# Patient Record
Sex: MALE | Race: WHITE | NOT HISPANIC OR LATINO | Employment: FULL TIME | URBAN - METROPOLITAN AREA
[De-identification: names, ages, dates, MRNs, and addresses within clinical notes are randomized per-mention and may not be internally consistent; named-entity substitution may affect disease eponyms.]

---

## 2017-01-12 ENCOUNTER — OFFICE VISIT (OUTPATIENT)
Dept: MEDICAL GROUP | Facility: MEDICAL CENTER | Age: 32
End: 2017-01-12
Payer: COMMERCIAL

## 2017-01-12 VITALS
RESPIRATION RATE: 16 BRPM | DIASTOLIC BLOOD PRESSURE: 96 MMHG | HEIGHT: 71 IN | BODY MASS INDEX: 32.34 KG/M2 | WEIGHT: 231 LBS | SYSTOLIC BLOOD PRESSURE: 132 MMHG | HEART RATE: 96 BPM | TEMPERATURE: 98.2 F | OXYGEN SATURATION: 93 %

## 2017-01-12 DIAGNOSIS — E66.9 OBESITY (BMI 30-39.9): ICD-10-CM

## 2017-01-12 DIAGNOSIS — I10 ESSENTIAL HYPERTENSION: ICD-10-CM

## 2017-01-12 DIAGNOSIS — M54.2 NECK PAIN: ICD-10-CM

## 2017-01-12 PROCEDURE — 99213 OFFICE O/P EST LOW 20 MIN: CPT | Performed by: NURSE PRACTITIONER

## 2017-01-12 ASSESSMENT — ENCOUNTER SYMPTOMS: NECK PAIN: 1

## 2017-01-12 ASSESSMENT — PATIENT HEALTH QUESTIONNAIRE - PHQ9: CLINICAL INTERPRETATION OF PHQ2 SCORE: 0

## 2017-01-12 NOTE — PROGRESS NOTES
"Subjective:      Berny Robles is a 31 y.o. male who presents with Follow-Up            HPI Berny Robles is here today mainly requesting a letter to return to work because of recent missed work for neck pain.    Patient states he stayed at a hotel about a week ago which had a very poorly supported bed and he woke up with neck pain. He states the pain is almost gone but will need a note to return to work tomorrow. He states the pain is mostly in the posterior left neck and is about 80% better. The pain does not radiate into his arms and there is no pain with rest. He denies shortness of breath, chest pain, dizziness or headache. Symptoms are better with ibuprofen.    Patient's blood pressure is noted to be mildly elevated today and it appears that his last 2 blood pressure readings from 2015 and 2014 also were mildly elevated. He states when he goes for his [employer] license his blood pressures are typically in the 140/90 range. He has never been on antihypertensive medicine. He does not smoke. I have not seen patient since 2014.        Social History   Substance Use Topics   • Smoking status: Never Smoker    • Smokeless tobacco: Never Used   • Alcohol Use: 0.0 oz/week     0 Standard drinks or equivalent per week      Comment: rare     No current outpatient prescriptions on file.     No current facility-administered medications for this visit.   History reviewed. No pertinent past medical history.   Family History   Problem Relation Age of Onset   • Hypertension Father    • Heart Attack Father      45       Review of Systems   Musculoskeletal: Positive for neck pain.   All other systems reviewed and are negative.         Objective:     /96 mmHg  Pulse 96  Temp(Src) 36.8 °C (98.2 °F)  Resp 16  Ht 1.803 m (5' 11\")  Wt 104.781 kg (231 lb)  BMI 32.23 kg/m2  SpO2 93%     Physical Exam   Constitutional: He is oriented to person, place, and time. He appears well-developed and well-nourished. No distress. "   HENT:   Head: Normocephalic and atraumatic.   Right Ear: External ear normal.   Left Ear: External ear normal.   Nose: Nose normal.   Mouth/Throat: Oropharynx is clear and moist.   Eyes: Conjunctivae are normal. Right eye exhibits no discharge. Left eye exhibits no discharge.   Neck: Normal range of motion. Neck supple. No tracheal deviation present. No thyromegaly present.   Cardiovascular: Normal rate, regular rhythm and normal heart sounds.    No murmur heard.  Pulmonary/Chest: Effort normal and breath sounds normal. No respiratory distress. He has no wheezes. He has no rales.   Musculoskeletal:   Mildly decreased range of motion with rotation of the head and neck but no tenderness or radiculopathy. 5/5 strength of upper extremities bilaterally to flexion and extension.   Lymphadenopathy:     He has no cervical adenopathy.   Neurological: He is alert and oriented to person, place, and time. Coordination normal.   Skin: Skin is warm and dry. No rash noted. He is not diaphoretic. No erythema.   Psychiatric: He has a normal mood and affect. His behavior is normal. Judgment and thought content normal.   Nursing note and vitals reviewed.              Assessment/Plan:     1. Neck pain  Patient was given a letter that he may return to work tomorrow and may continue to use ibuprofen short-term for pain. I spoke with him about heat to the area and returning if it does not improve.    2. Essential hypertension  I reviewed with patient that his blood pressure has been running elevated with most of his visits. I told him to try to keep track of his blood pressure outside the office and then return in 6 weeks after doing lab work. If his blood pressure is still elevated I recommended we start him on a low dose antihypertensive medicine and the risks of untreated blood pressure elevations.  - COMP METABOLIC PANEL; Future  - LIPID PROFILE; Future  - TSH; Future  - VITAMIN 1,25 DIHYDROXY; Future    3. Obesity (BMI  30-39.9)    - Patient identified as having weight management issue.  Appropriate orders and counseling given.

## 2017-01-12 NOTE — Clinical Note
January 12, 2017       Patient: Berny Robles   YOB: 1985   Date of Visit: 1/12/2017         To Whom It May Concern:    It is my medical opinion that Berny Robles return to work on 1/13/17..    If you have any questions or concerns, please don't hesitate to call [phone]          Sincerely,          TYLER Chowdary.  Electronically Signed

## 2017-01-12 NOTE — MR AVS SNAPSHOT
"        Berny Robles   2017 1:40 PM   Office Visit   MRN: [id number]    Department:  [address] [place] [address]   Dept Phone:  [phone]    Description:  Male : 1985   Provider:  SEBASTIEN Chowdary           Reason for Visit     Follow-Up release papers      Allergies as of 2017     Allergen Noted Reactions    Norco [Hydrocodone-Acetaminophen] 2012       Causes Headache      You were diagnosed with     Essential hypertension   [6132886]       Neck pain   [551132]         Vital Signs     Blood Pressure Pulse Temperature Respirations Height Weight    132/96 mmHg 96 36.8 °C (98.2 °F) 16 1.803 m (5' 11\") 104.781 kg (231 lb)    Body Mass Index Oxygen Saturation Smoking Status             32.23 kg/m2 93% Never Smoker          Basic Information     Date Of Birth Sex Race Ethnicity Preferred Language    1985 Male White Non- English      Your appointments     2017  1:40 PM   Established Patient with SEBASTIEN Chowdary   [facility] [address]    [address] [facility] [address]  [facility] [address]   [phone]           You will be receiving a confirmation call a few days before your appointment from our automated call confirmation system.              Problem List              ICD-10-CM Priority Class Noted - Resolved    FH: heart attack Z82.49   2012 - Present    Back pain M54.9   2012 - Present      Health Maintenance        Date Due Completion Dates    IMM DTaP/Tdap/Td Vaccine (1 - Tdap) 2006 ---    IMM INFLUENZA (1) 2016 ---            Current Immunizations     No immunizations on file.      Below and/or attached are the medications your provider expects you to take. Review all of your home medications and newly ordered medications with your provider and/or pharmacist. Follow medication instructions as directed by your provider and/or pharmacist. Please keep your medication list with you and share with your provider. Update the " information when medications are discontinued, doses are changed, or new medications (including over-the-counter products) are added; and carry medication information at all times in the event of emergency situations     Allergies:  NORCO - (reactions not documented)               Medications  Valid as of: January 12, 2017 -  1:59 PM    Generic Name Brand Name Tablet Size Instructions for use    .                 Medicines prescribed today were sent to:     [facility] [address] [place] [address], [place] - [address] [facility]    [address] [facility] [place] [address]    Phone: [phone] Fax: [phone]    Open 24 Hours?: No      Medication refill instructions:       If your prescription bottle indicates you have medication refills left, it is not necessary to call your provider’s office. Please contact your pharmacy and they will refill your medication.    If your prescription bottle indicates you do not have any refills left, you may request refills at any time through one of the following ways: The online [employer] system (except Urgent Care), by calling your provider’s office, or by asking your pharmacy to contact your provider’s office with a refill request. Medication refills are processed only during regular business hours and may not be available until the next business day. Your provider may request additional information or to have a follow-up visit with you prior to refilling your medication.   *Please Note: Medication refills are assigned a new Rx number when refilled electronically. Your pharmacy may indicate that no refills were authorized even though a new prescription for the same medication is available at the pharmacy. Please request the medicine by name with the pharmacy before contacting your provider for a refill.        Your To Do List     Future Labs/Procedures Complete By Expires    COMP METABOLIC PANEL  As directed 1/13/2018    LIPID PROFILE  As directed 1/13/2018    TSH  As directed  1/13/2018    VITAMIN 1,25 DIHYDROXY  As directed 1/13/2018         MyChart Status: Patient Declined

## 2021-01-14 ENCOUNTER — OFFICE VISIT (OUTPATIENT)
Dept: URGENT CARE | Facility: CLINIC | Age: 36
End: 2021-01-14

## 2021-01-14 DIAGNOSIS — Z01.10 ENCOUNTER FOR AUDIOLOGY EVALUATION: ICD-10-CM

## 2021-01-14 DIAGNOSIS — Z02.1 PRE-EMPLOYMENT DRUG SCREENING: ICD-10-CM

## 2021-01-14 LAB
AMP AMPHETAMINE: NORMAL
BAR BARBITURATES: NORMAL
BZO BENZODIAZEPINES: NORMAL
COC COCAINE: NORMAL
INT CON NEG: NORMAL
INT CON POS: NORMAL
MDMA ECSTASY: NORMAL
MET METHAMPHETAMINES: NORMAL
MTD METHADONE: NORMAL
OPI OPIATES: NORMAL
OXY OXYCODONE: NORMAL
PCP PHENCYCLIDINE: NORMAL
POC URINE DRUG SCREEN OCDRS: NORMAL
THC: NORMAL

## 2021-01-14 PROCEDURE — 8916 PR CLEARANCE ONLY: Performed by: NURSE PRACTITIONER

## 2021-01-14 PROCEDURE — 80305 DRUG TEST PRSMV DIR OPT OBS: CPT | Performed by: NURSE PRACTITIONER

## 2021-01-14 PROCEDURE — 92552 PURE TONE AUDIOMETRY AIR: CPT | Performed by: NURSE PRACTITIONER

## 2021-01-14 NOTE — PROGRESS NOTES
Subjective:      Berny Robles is a 35 y.o. male who presents with Medical Clearance (Audio/UDS)                    Audiogram/drug screen only, no exam.    PMH:  has no past medical history on file.  MEDS: No current outpatient medications on file.  ALLERGIES:   Allergies   Allergen Reactions   • Norco [Hydrocodone-Acetaminophen]      Causes Headache     SURGHX:   Past Surgical History:   Procedure Laterality Date   • TONSILLECTOMY       SOCHX:  reports that he has never smoked. He has never used smokeless tobacco. He reports current alcohol use. He reports that he does not use drugs.  FH: Family history was reviewed, no pertinent findings to report    ROS       Objective:     There were no vitals taken for this visit.     Physical Exam            Assessment/Plan:        1. Pre-employment drug screening    - POCT 11 Panel Urine Drug Screen    2. Encounter for audiology evaluation

## 2021-06-16 ENCOUNTER — OFFICE VISIT (OUTPATIENT)
Dept: URGENT CARE | Facility: CLINIC | Age: 36
End: 2021-06-16
Payer: COMMERCIAL

## 2021-06-16 VITALS
HEIGHT: 71 IN | SYSTOLIC BLOOD PRESSURE: 130 MMHG | HEART RATE: 81 BPM | TEMPERATURE: 97.3 F | BODY MASS INDEX: 32.2 KG/M2 | WEIGHT: 230 LBS | OXYGEN SATURATION: 94 % | DIASTOLIC BLOOD PRESSURE: 90 MMHG | RESPIRATION RATE: 18 BRPM

## 2021-06-16 DIAGNOSIS — R42 VERTIGO: ICD-10-CM

## 2021-06-16 PROCEDURE — 99214 OFFICE O/P EST MOD 30 MIN: CPT | Performed by: NURSE PRACTITIONER

## 2021-06-16 RX ORDER — MECLIZINE HYDROCHLORIDE 25 MG/1
25 TABLET ORAL 3 TIMES DAILY PRN
Qty: 30 TABLET | Refills: 0 | Status: SHIPPED | OUTPATIENT
Start: 2021-06-16 | End: 2023-03-08

## 2021-06-16 RX ORDER — ONDANSETRON 4 MG/1
4 TABLET, ORALLY DISINTEGRATING ORAL EVERY 8 HOURS PRN
Qty: 10 TABLET | Refills: 0 | Status: SHIPPED | OUTPATIENT
Start: 2021-06-16 | End: 2023-03-08

## 2021-06-16 ASSESSMENT — ENCOUNTER SYMPTOMS
SHORTNESS OF BREATH: 0
FEVER: 0
VISUAL CHANGE: 0
COUGH: 0
CHILLS: 0
VERTIGO: 1
MYALGIAS: 0
NAUSEA: 1
SORE THROAT: 0
FATIGUE: 0
VOMITING: 0
EYE REDNESS: 0
DIZZINESS: 1

## 2021-06-16 NOTE — LETTER
June 16, 2021         Patient: Berny Robles   YOB: 1985   Date of Visit: 6/16/2021           To Whom it May Concern:    Berny Robles was seen in my clinic on 6/16/2021. He may return to work on 6/19/21    If you have any questions or concerns, please don't hesitate to call.        Sincerely,           IGGY Mcgarry  Electronically Signed

## 2021-06-17 NOTE — PROGRESS NOTES
Subjective:   Berny Robles is a 36 y.o. male who presents for Dizziness (when turning head, worse when looking up quickly, never happened before )      Dizziness  This is a new problem. The current episode started yesterday. The problem occurs intermittently (worse with head movement). The problem has been unchanged. Associated symptoms include nausea and vertigo. Pertinent negatives include no chest pain, chills, congestion, coughing, fatigue, fever, myalgias, rash, sore throat, visual change or vomiting. Exacerbated by: head movement  He has tried nothing (epley maneuver) for the symptoms. The treatment provided no relief.       Review of Systems   Constitutional: Negative for chills, fatigue and fever.   HENT: Negative for congestion and sore throat.    Eyes: Negative for redness.   Respiratory: Negative for cough and shortness of breath.    Cardiovascular: Negative for chest pain.   Gastrointestinal: Positive for nausea. Negative for vomiting.   Genitourinary: Negative for dysuria.   Musculoskeletal: Negative for myalgias.   Skin: Negative for rash.   Neurological: Positive for dizziness and vertigo.       Medications:    • meclizine Tabs  • ondansetron Tbdp    Allergies: Norco [hydrocodone-acetaminophen]    Problem List: Berny Robles does not have any pertinent problems on file.    Surgical History:  Past Surgical History:   Procedure Laterality Date   • TONSILLECTOMY         Past Social Hx: Berny Robles  reports that he has never smoked. He has never used smokeless tobacco. He reports current alcohol use. He reports that he does not use drugs.     Past Family Hx:  Berny Robles family history includes Heart Attack in his father; Hypertension in his father.     Problem list, medications, and allergies reviewed by myself today in Epic.     Objective:     /90 (BP Location: Left arm, Patient Position: Sitting, BP Cuff Size: Adult long)   Pulse 81   Temp 36.3 °C (97.3 °F) (Temporal)   Resp  "18   Ht 1.803 m (5' 11\")   Wt 104 kg (230 lb)   SpO2 94%   BMI 32.08 kg/m²     Physical Exam  Vitals and nursing note reviewed.   Constitutional:       General: He is not in acute distress.     Appearance: He is well-developed.   HENT:      Head: Normocephalic and atraumatic.      Right Ear: External ear normal.      Left Ear: External ear normal.      Nose: Nose normal.      Mouth/Throat:      Mouth: Mucous membranes are moist.   Eyes:      Extraocular Movements:      Right eye: Nystagmus present.      Conjunctiva/sclera: Conjunctivae normal.   Neck:      Meningeal: Brudzinski's sign and Kernig's sign absent.   Cardiovascular:      Rate and Rhythm: Normal rate.   Pulmonary:      Effort: Pulmonary effort is normal. No respiratory distress.      Breath sounds: Normal breath sounds.   Abdominal:      General: There is no distension.   Musculoskeletal:         General: Normal range of motion.      Cervical back: Full passive range of motion without pain.   Skin:     General: Skin is warm and dry.   Neurological:      General: No focal deficit present.      Mental Status: He is alert and oriented to person, place, and time. Mental status is at baseline. He is not disoriented.      GCS: GCS eye subscore is 4. GCS verbal subscore is 5. GCS motor subscore is 6.      Cranial Nerves: No cranial nerve deficit.      Sensory: No sensory deficit.      Motor: Motor function is intact.      Coordination: Coordination is intact.      Gait: Gait (gait at baseline) normal.      Deep Tendon Reflexes: Reflexes are normal and symmetric.      Comments: [place]-Hallpike positive   Psychiatric:         Judgment: Judgment normal.         Assessment/Plan:     Diagnosis and associated orders:     1. Vertigo  ondansetron (ZOFRAN ODT) 4 MG TABLET DISPERSIBLE    meclizine (ANTIVERT) 25 MG Tab    REFERRAL TO PHYSICAL THERAPY        Comments/MDM:     • Patient is a 36-year-old male present with the stated above, on physical exam patient does have " nystagmus, [place]-Hallpike positive, consistent with diagnoses.  Neurologically patient does appear to be intact.  Discussed home therapy with the Epley maneuver,Differential diagnosis, natural history, supportive care, and indications for immediate follow-up discussed.              Please note that this dictation was created using voice recognition software. I have made a reasonable attempt to correct obvious errors, but I expect that there are errors of grammar and possibly content that I did not discover before finalizing the note.    This note was electronically signed by Martín MEMBRENO.

## 2023-03-08 ENCOUNTER — OFFICE VISIT (OUTPATIENT)
Dept: URGENT CARE | Facility: PHYSICIAN GROUP | Age: 38
End: 2023-03-08
Payer: COMMERCIAL

## 2023-03-08 ENCOUNTER — HOSPITAL ENCOUNTER (OUTPATIENT)
Dept: LAB | Facility: MEDICAL CENTER | Age: 38
End: 2023-03-08
Attending: FAMILY MEDICINE
Payer: COMMERCIAL

## 2023-03-08 VITALS
DIASTOLIC BLOOD PRESSURE: 80 MMHG | BODY MASS INDEX: 32.76 KG/M2 | RESPIRATION RATE: 16 BRPM | HEIGHT: 71 IN | WEIGHT: 234 LBS | TEMPERATURE: 98.6 F | OXYGEN SATURATION: 98 % | SYSTOLIC BLOOD PRESSURE: 104 MMHG | HEART RATE: 99 BPM

## 2023-03-08 DIAGNOSIS — R10.9 ABDOMINAL PAIN, UNSPECIFIED ABDOMINAL LOCATION: ICD-10-CM

## 2023-03-08 LAB
ALBUMIN SERPL BCP-MCNC: 4.7 G/DL (ref 3.2–4.9)
ALBUMIN/GLOB SERPL: 1.7 G/DL
ALP SERPL-CCNC: 94 U/L (ref 30–99)
ALT SERPL-CCNC: 38 U/L (ref 2–50)
AMYLASE SERPL-CCNC: 45 U/L (ref 20–103)
ANION GAP SERPL CALC-SCNC: 11 MMOL/L (ref 7–16)
AST SERPL-CCNC: 23 U/L (ref 12–45)
BASOPHILS # BLD AUTO: 0.9 % (ref 0–1.8)
BASOPHILS # BLD: 0.09 K/UL (ref 0–0.12)
BILIRUB SERPL-MCNC: 0.4 MG/DL (ref 0.1–1.5)
BUN SERPL-MCNC: 16 MG/DL (ref 8–22)
CALCIUM ALBUM COR SERPL-MCNC: 9 MG/DL (ref 8.5–10.5)
CALCIUM SERPL-MCNC: 9.6 MG/DL (ref 8.5–10.5)
CHLORIDE SERPL-SCNC: 105 MMOL/L (ref 96–112)
CO2 SERPL-SCNC: 27 MMOL/L (ref 20–33)
CREAT SERPL-MCNC: 1.01 MG/DL (ref 0.5–1.4)
EOSINOPHIL # BLD AUTO: 0.12 K/UL (ref 0–0.51)
EOSINOPHIL NFR BLD: 1.2 % (ref 0–6.9)
ERYTHROCYTE [DISTWIDTH] IN BLOOD BY AUTOMATED COUNT: 44.2 FL (ref 35.9–50)
GFR SERPLBLD CREATININE-BSD FMLA CKD-EPI: 99 ML/MIN/1.73 M 2
GLOBULIN SER CALC-MCNC: 2.7 G/DL (ref 1.9–3.5)
GLUCOSE SERPL-MCNC: 93 MG/DL (ref 65–99)
HCT VFR BLD AUTO: 50.3 % (ref 42–52)
HGB BLD-MCNC: 16.2 G/DL (ref 14–18)
IMM GRANULOCYTES # BLD AUTO: 0.02 K/UL (ref 0–0.11)
IMM GRANULOCYTES NFR BLD AUTO: 0.2 % (ref 0–0.9)
LIPASE SERPL-CCNC: 21 U/L (ref 11–82)
LYMPHOCYTES # BLD AUTO: 2.99 K/UL (ref 1–4.8)
LYMPHOCYTES NFR BLD: 29.5 % (ref 22–41)
MCH RBC QN AUTO: 28.1 PG (ref 27–33)
MCHC RBC AUTO-ENTMCNC: 32.2 G/DL (ref 33.7–35.3)
MCV RBC AUTO: 87.3 FL (ref 81.4–97.8)
MONOCYTES # BLD AUTO: 0.77 K/UL (ref 0–0.85)
MONOCYTES NFR BLD AUTO: 7.6 % (ref 0–13.4)
NEUTROPHILS # BLD AUTO: 6.15 K/UL (ref 1.82–7.42)
NEUTROPHILS NFR BLD: 60.6 % (ref 44–72)
NRBC # BLD AUTO: 0.02 K/UL
NRBC BLD-RTO: 0.2 /100 WBC
PLATELET # BLD AUTO: 213 K/UL (ref 164–446)
PMV BLD AUTO: 12.2 FL (ref 9–12.9)
POTASSIUM SERPL-SCNC: 4.6 MMOL/L (ref 3.6–5.5)
PROT SERPL-MCNC: 7.4 G/DL (ref 6–8.2)
RBC # BLD AUTO: 5.76 M/UL (ref 4.7–6.1)
SODIUM SERPL-SCNC: 143 MMOL/L (ref 135–145)
TSH SERPL DL<=0.005 MIU/L-ACNC: 1.08 UIU/ML (ref 0.38–5.33)
WBC # BLD AUTO: 10.1 K/UL (ref 4.8–10.8)

## 2023-03-08 PROCEDURE — 99214 OFFICE O/P EST MOD 30 MIN: CPT | Performed by: FAMILY MEDICINE

## 2023-03-08 PROCEDURE — 82150 ASSAY OF AMYLASE: CPT

## 2023-03-08 PROCEDURE — 80053 COMPREHEN METABOLIC PANEL: CPT

## 2023-03-08 PROCEDURE — 84443 ASSAY THYROID STIM HORMONE: CPT

## 2023-03-08 PROCEDURE — 85025 COMPLETE CBC W/AUTO DIFF WBC: CPT

## 2023-03-08 PROCEDURE — 36415 COLL VENOUS BLD VENIPUNCTURE: CPT

## 2023-03-08 PROCEDURE — 83690 ASSAY OF LIPASE: CPT

## 2023-03-08 NOTE — LETTER
March 8, 2023         Patient: Berny Robles   YOB: 1985   Date of Visit: 3/8/2023           To Whom it May Concern:    Berny Robles was seen in my clinic on 3/8/2023.     Please excuse from work for 3/8/23 due to medical condition.     If you have any questions or concerns, please don't hesitate to call.        Sincerely,           Colin Myers M.D.  Electronically Signed

## 2023-03-08 NOTE — PROGRESS NOTES
Chief Complaint:    Chief Complaint   Patient presents with    Other     Hunger? And wet defecation?        History of Present Illness:    X 1 week, intermittent, unpredictable bilateral mid abdominal pain, does not feel burning. Eating sometimes lessens the pain. Eating never makes the pain worse. Softer stools sometimes for few weeks. No fever, chills, nausea, vomiting, constipation, or urine symptoms. Has all abdominal organs. No regular meds.      Past Medical History:    History reviewed. No pertinent past medical history.    Past Surgical History:    Past Surgical History:   Procedure Laterality Date    TONSILLECTOMY       Social History:    Social History     Socioeconomic History    Marital status: Single     Spouse name: Not on file    Number of children: Not on file    Years of education: Not on file    Highest education level: Not on file   Occupational History    Not on file   Tobacco Use    Smoking status: Never    Smokeless tobacco: Never   Substance and Sexual Activity    Alcohol use: Yes     Alcohol/week: 0.0 oz     Comment: rare    Drug use: No    Sexual activity: Yes     Partners: Female   Other Topics Concern    Not on file   Social History Narrative    Not on file     Social Determinants of Health     Financial Resource Strain: Not on file   Food Insecurity: Not on file   Transportation Needs: Not on file   Physical Activity: Not on file   Stress: Not on file   Social Connections: Not on file   Intimate Partner Violence: Not on file   Housing Stability: Not on file     Family History:    Family History   Problem Relation Age of Onset    Hypertension Father     Heart Attack Father         45     Medications:    No current outpatient medications on file prior to visit.     No current facility-administered medications on file prior to visit.     Allergies:    Allergies   Allergen Reactions    Norco [Hydrocodone-Acetaminophen]      Causes Headache       Vitals:    Vitals:    03/08/23 1251   BP:  "104/80   Pulse: 99   Resp: 16   Temp: 37 °C (98.6 °F)   TempSrc: Temporal   SpO2: 98%   Weight: 106 kg (234 lb)   Height: 1.803 m (5' 11\")       Physical Exam:    Constitutional: Vital signs reviewed. Appears well-developed and well-nourished. No acute distress.   Eyes: Sclera white, conjunctivae clear.   ENT: External ears normal. Hearing normal.   Neck: Neck supple.   Pulmonary/Chest: Respirations non-labored.   Abdomen: Bowel sounds are normal active. Soft, non-distended, and non-tender to palpation.  Musculoskeletal: Normal gait. No muscular atrophy or weakness.  Neurological: Alert and oriented to person, place, and time. Muscle tone normal. Coordination normal.   Skin: No rashes or lesions. Warm, dry, normal turgor.  Psychiatric: Normal mood and affect. Behavior is normal. Judgment and thought content normal.       Medical Decision Makin. Abdominal pain, unspecified abdominal location  - Comp Metabolic Panel; Future  - CBC WITH DIFFERENTIAL; Future  - AMYLASE; Future  - LIPASE; Future  - TSH WITH REFLEX TO FT4; Future       Work note given - excuse 3/8/23.    Discussed with him DDX, management options, and risks, benefits, and alternatives to treatment plan agreed upon.    X 1 week, intermittent, unpredictable bilateral mid abdominal pain, does not feel burning. Eating sometimes lessens the pain. Eating never makes the pain worse. Softer stools sometimes for few weeks. No fever, chills, nausea, vomiting, constipation, or urine symptoms. Has all abdominal organs. No regular meds.    ? etiology of symptoms.     He does not have an acute abdomen on exam.    He would like to check blood tests and prefers to defer any advanced imaging such as CT imaging at this time.    Labs ordered - advised results will show in MyChart with further instructions at that time based on results.  "

## 2025-03-17 ENCOUNTER — OFFICE VISIT (OUTPATIENT)
Dept: URGENT CARE | Facility: PHYSICIAN GROUP | Age: 40
End: 2025-03-17
Payer: COMMERCIAL

## 2025-03-17 VITALS
TEMPERATURE: 97.8 F | OXYGEN SATURATION: 94 % | HEART RATE: 114 BPM | SYSTOLIC BLOOD PRESSURE: 110 MMHG | RESPIRATION RATE: 16 BRPM | DIASTOLIC BLOOD PRESSURE: 70 MMHG | WEIGHT: 259 LBS | BODY MASS INDEX: 35.08 KG/M2 | HEIGHT: 72 IN

## 2025-03-17 DIAGNOSIS — R00.0 TACHYCARDIA: ICD-10-CM

## 2025-03-17 DIAGNOSIS — H66.001 NON-RECURRENT ACUTE SUPPURATIVE OTITIS MEDIA OF RIGHT EAR WITHOUT SPONTANEOUS RUPTURE OF TYMPANIC MEMBRANE: ICD-10-CM

## 2025-03-17 PROCEDURE — 3074F SYST BP LT 130 MM HG: CPT

## 2025-03-17 PROCEDURE — 99214 OFFICE O/P EST MOD 30 MIN: CPT

## 2025-03-17 PROCEDURE — 3078F DIAST BP <80 MM HG: CPT

## 2025-03-17 RX ORDER — FLUTICASONE PROPIONATE 50 MCG
1 SPRAY, SUSPENSION (ML) NASAL 2 TIMES DAILY
Qty: 16 G | Refills: 0 | Status: SHIPPED | OUTPATIENT
Start: 2025-03-17

## 2025-03-17 ASSESSMENT — ENCOUNTER SYMPTOMS
ABDOMINAL PAIN: 0
SORE THROAT: 1
SWOLLEN GLANDS: 1
NAUSEA: 0
FEVER: 0
VOMITING: 0
INSOMNIA: 1
DIARRHEA: 0
SHORTNESS OF BREATH: 0
MYALGIAS: 0
SINUS PRESSURE: 1
SINUS PAIN: 1
HEADACHES: 0
COUGH: 0
CHILLS: 0

## 2025-03-17 NOTE — LETTER
[facility] [address] [address]     March 17, 2025    Patient: Berny Robles   YOB: 1985   Date of Visit: 3/17/2025       To Whom It May Concern:    Berny Robles was seen and treated in our department on 3/17/2025. Please excuse Patient today for recent illness.     Sincerely,     MARILOU Montejo.

## 2025-03-17 NOTE — PROGRESS NOTES
Subjective:   Berny Robles is a 39 y.o. male who presents for Sinusitis (4 days of symptoms, been trying to take advil and other medication but is affecting his sleep. )      Sinusitis  This is a new problem. The current episode started in the past 7 days. The problem has been gradually worsening since onset. There has been no fever. Associated symptoms include congestion, ear pain (Right ear), sinus pressure, a sore throat and swollen glands. Pertinent negatives include no chills, coughing, headaches or shortness of breath. Past treatments include oral decongestants and acetaminophen. The treatment provided no relief.       Review of Systems   Constitutional:  Positive for malaise/fatigue. Negative for chills and fever.   HENT:  Positive for congestion, ear pain (Right ear), sinus pressure, sinus pain and sore throat. Negative for hearing loss.    Respiratory:  Negative for cough and shortness of breath.    Cardiovascular:  Negative for chest pain.   Gastrointestinal:  Negative for abdominal pain, diarrhea, nausea and vomiting.   Genitourinary:  Negative for dysuria.   Musculoskeletal:  Negative for myalgias.   Skin:  Negative for rash.   Neurological:  Negative for headaches.   Psychiatric/Behavioral:  The patient has insomnia.        Allergies   Allergen Reactions    Norco [Hydrocodone-Acetaminophen]      Causes Headache       Patient Active Problem List    Diagnosis Date Noted    Obesity (BMI 30-39.9) 01/12/2017    FH: heart attack 02/08/2012       Current Outpatient Medications Ordered in Epic   Medication Sig Dispense Refill    amoxicillin-clavulanate (AUGMENTIN) 875-125 MG Tab Take 1 Tablet by mouth 2 times a day for 7 days. 14 Tablet 0    fluticasone (FLONASE) 50 MCG/ACT nasal spray Administer 1 Spray into affected nostril(S) 2 times a day. 16 g 0     No current Epic-ordered facility-administered medications on file.       Past Surgical History:   Procedure Laterality Date    TONSILLECTOMY         Social  History     Tobacco Use    Smoking status: Never    Smokeless tobacco: Never   Vaping Use    Vaping status: Never Used   Substance Use Topics    Alcohol use: Not Currently     Comment: rare    Drug use: No       family history includes Heart Attack in his father; Hypertension in his father.     Problem list, medications, and allergies reviewed by myself today in Epic.     Objective:   /70   Pulse (!) 114   Temp 36.6 °C (97.8 °F) (Temporal)   Resp 16   Ht 1.829 m (6')   Wt 117 kg (259 lb)   SpO2 94%   BMI 35.13 kg/m²     Physical Exam  Vitals and nursing note reviewed.   Constitutional:       General: He is not in acute distress.     Appearance: Normal appearance. He is ill-appearing.   HENT:      Head: Normocephalic and atraumatic.      Right Ear: Tympanic membrane is erythematous and bulging. Tympanic membrane is not perforated.      Left Ear: Tympanic membrane normal. Tympanic membrane is not perforated, erythematous or bulging.      Nose: Congestion present. No rhinorrhea.      Right Turbinates: Enlarged.      Left Turbinates: Enlarged.      Right Sinus: Maxillary sinus tenderness present.      Left Sinus: Maxillary sinus tenderness present.      Mouth/Throat:      Mouth: Mucous membranes are moist.      Pharynx: Oropharynx is clear. No oropharyngeal exudate or posterior oropharyngeal erythema.   Eyes:      Conjunctiva/sclera: Conjunctivae normal.      Pupils: Pupils are equal, round, and reactive to light.   Cardiovascular:      Rate and Rhythm: Normal rate.      Heart sounds: Normal heart sounds.   Pulmonary:      Effort: Pulmonary effort is normal. No respiratory distress.      Breath sounds: Normal breath sounds. No stridor. No wheezing or rhonchi.   Abdominal:      General: Abdomen is flat.      Palpations: Abdomen is soft.   Skin:     General: Skin is warm and dry.      Capillary Refill: Capillary refill takes less than 2 seconds.   Neurological:      Mental Status: He is alert and oriented to  person, place, and time.   Psychiatric:         Mood and Affect: Mood normal.         Behavior: Behavior normal.         Assessment/Plan:     1. Non-recurrent acute suppurative otitis media of right ear without spontaneous rupture of tympanic membrane  amoxicillin-clavulanate (AUGMENTIN) 875-125 MG Tab    fluticasone (FLONASE) 50 MCG/ACT nasal spray      2. Tachycardia          After assessment patient presents here with acute illness with systems symptoms including tachycardia 114.  Patient at this time has noted acute otitis media of right ear without rupture after a recent sinus infection.  Patient has been using multiple over-the-counter medications that have helped minimally and have caused some mild insomnia and patient has been unable to sleep.  Patient at this time was placed on course of Augmentin and Flonase.  Patient instructed to take these as prescribed.  Patient instructed to monitor for any worsening signs and symptoms and if no improvement over the next few days or any other concerns he was instructed return to urgent care for reevaluation.    Take full course of antibiotic  Tylenol and Motrin for fever and pain  OTC meds as discussed including oral decongestant if tolerated  Increase fluids and rest  Nasal spray, nasal rinse/wash, vicki pot    Differential diagnosis, natural history, and supportive care discussed. We also reviewed side effects of medication including allergic response, GI upset, tendon injury, rash, sedation etc. Patient and/or guardian voices understanding.      Advised the patient to follow-up with the primary care physician for recheck, reevaluation, and consideration of further management.    Please note that this dictation was created using voice recognition software. I have made every reasonable attempt to correct obvious errors, but I expect that there are errors of grammar and possibly content that I did not discover before finalizing the note.    This note was  electronically signed by IGGY Drew